# Patient Record
Sex: FEMALE | Race: WHITE
[De-identification: names, ages, dates, MRNs, and addresses within clinical notes are randomized per-mention and may not be internally consistent; named-entity substitution may affect disease eponyms.]

---

## 2018-04-10 ENCOUNTER — HOSPITAL ENCOUNTER (OUTPATIENT)
Dept: HOSPITAL 80 - FIMAGING | Age: 38
End: 2018-04-10
Attending: OBSTETRICS & GYNECOLOGY
Payer: COMMERCIAL

## 2018-04-10 DIAGNOSIS — O09.522: Primary | ICD-10-CM

## 2018-04-10 DIAGNOSIS — Z3A.13: ICD-10-CM

## 2018-05-22 ENCOUNTER — HOSPITAL ENCOUNTER (OUTPATIENT)
Dept: HOSPITAL 80 - FIMAGING | Age: 38
End: 2018-05-22
Attending: OBSTETRICS & GYNECOLOGY
Payer: COMMERCIAL

## 2018-05-22 DIAGNOSIS — Z3A.19: ICD-10-CM

## 2018-05-22 DIAGNOSIS — O09.522: Primary | ICD-10-CM

## 2018-10-07 ENCOUNTER — HOSPITAL ENCOUNTER (INPATIENT)
Dept: HOSPITAL 80 - FLD | Age: 38
LOS: 2 days | Discharge: HOME | End: 2018-10-09
Attending: ADVANCED PRACTICE MIDWIFE | Admitting: ADVANCED PRACTICE MIDWIFE
Payer: COMMERCIAL

## 2018-10-07 DIAGNOSIS — Z3A.38: ICD-10-CM

## 2018-10-07 PROCEDURE — 0KQM0ZZ REPAIR PERINEUM MUSCLE, OPEN APPROACH: ICD-10-PCS | Performed by: ADVANCED PRACTICE MIDWIFE

## 2018-10-07 NOTE — OBDEL
Birth Info


Birth Type: Vaginal


Presentation at Delivery: Vertex


L&D Analgesia/Anesthesia Type: None


GBS+: No


Intrapartum Medications: 





 














Discontinued Medications














Generic Name Dose Route Start Last Admin





  Trade Name Prabha  PRN Reason Stop Dose Admin


 


Ibuprofen  600 mg  10/07/18 16:44  10/07/18 19:13





  Motrin  PO   600 mg





  ONCE PRN   Administration





  post partum, pain   











Indications for Delivery: Spontaneous Labor, SROM





Vaginal Delivery





- Delivery Provider


Delivery Physician/CNM: Nikki Singh





- Labor and Delivery


Onset of Contractions Date: 10/07/18


Onset of Contractions Time: 12:00


Onset of Contractions Type: Spontaneous


Rupture of Membranes Date: 10/07/18


Rupture of Membranes Type: Spontaneous


Amniotic Fluid Color: Clear


Dilation Complete Date: 10/07/18


Placenta Delivery Date: 10/07/18


Placenta Delivery Time: 18:38


Total Hours of Labor: 6


Laceration: 2nd Degree


Repair: 3-0, Vicryl


Vaginal Sponge Count Correct: Yes


Vaginal Needle Count Correct: Yes


Vaginal Sweep Performed: Yes


EBL: 250


Delivery Events: Other (Specify) (body/shoulder cord)


Delivery Comment: 





mom delivered on hands/knees





 Birth Data


AMADOU: 10[birth year]


Gestational Age: 38 week(s) and 6 day(s)


  ** Zeng


Delivery Date: 10/07/18


Delivery Time: 18:21


Sex of Infant: Male


Apgar Score (1 Min): 8


Apgar Score (5 Min): 9





ICD10 Worksheet


Patient Problems: 


 Problems











Problem Status Onset


 


Labor and delivery, indication for care Acute  


 


 (spontaneous vaginal delivery) Acute  


 


Second degree perineal laceration during delivery Acute  














- ICD10 Problem Qualifiers


(1) Labor and delivery, indication for care








(2)  (spontaneous vaginal delivery)








(3) Second degree perineal laceration during delivery

## 2018-10-07 NOTE — PDGENHP
History and Physical


History and Physical: 


Prenatal Care: [facility]





HPI: 


Roxana Bynum is a 39yo  with IUP @ 38-6 weeks that presents to L&D 

with complaints of contractions since this afternoon. She denies any LOF, VB. 

She reports +FM. She is rating pain with contractions 6/10.  


EDC: 10/15/18 which is based on LMP: 18 which is known and consistent with 

Ultrasound at 7 weeks.


Her pregnancy is complicated by:  AMA





Review of Systems:


Constitutional: Denies any fever, chills, or fatigue


HEENT: denies any visual changes, difficulty swallowing, hearing loss


Cardiovascular: Denies any chest pain, palpitations, leg swelling


Respiratory: denies any cough, wheezing, or shortness of breathe


GI: Denies any nausea, vomiting, diarrhea, constipation


: denies any dysuria, urgency, frequency, vaginal bleeding


Musculoskeletal: denies any muscle or bone pain


Skin: denies any rashes


Neuro: denies any headache, seizures, lightheadedness, dizziness, or loss of 

consciousness


Psychiatric: denies any depression, anxiety, or SI/HI thoughts





HISTORY: 


Previous OB history: EAB


Past medical history: none


Past surgical history: oral surgery


Social: Denies any alcohol, tobacco, or drug use.


Family history: Not relevant


Medications: PNV, vitamin K, vitamin K, DHA, topical magnesium 


Allergies (list reaction): NKDA





PRENATAL LABS:


Rh: A+


ABS: Neg


Rubella: Immune


HbsAg: NR


HIV: NR


VDRL: NR


1hr:  73


GC: Neg


Chlamydia: Neg 


Pap: Normal


GBS:  negative 





PHYSICAL EXAM:


Constitutional: WN, A&Ox3


HEENT: normocephalic atraumatic, supple


Skin: Warm, dry, intact


Heart: RRR, no murmur


Chest: CTA-B


Abdomen: Soft, nontender, gravid


SVE: 5/90/-2


Extremities: edema, negative homans sign


Neuro: grossly normal


Psych: normal affect





Fetal assessment:  FHT baseline 135 +accels, no decels, moderate variability 


Contractions: toco q 2





Assessment: 


1) [id number] with IUP@ 38-6wks


2) active labor


3) GBS negative


4) Cat 1 FHR tracing





Plan: 


1) Admit to L&D


2) expectant management


3) anticipate 








Today's visit was approximately 30min, of which >50% of visit 20 min, was spent 

face to face with pt on direct counseling/coordination of care.

## 2018-10-08 RX ADMIN — DOCUSATE SODIUM PRN MG: 100 CAPSULE, LIQUID FILLED ORAL at 09:54

## 2018-10-08 RX ADMIN — IBUPROFEN SCH MG: 600 TABLET ORAL at 01:07

## 2018-10-08 RX ADMIN — ACETAMINOPHEN SCH MG: 325 TABLET ORAL at 13:17

## 2018-10-08 RX ADMIN — ACETAMINOPHEN SCH MG: 325 TABLET ORAL at 01:07

## 2018-10-08 RX ADMIN — DOCUSATE SODIUM PRN MG: 100 CAPSULE, LIQUID FILLED ORAL at 19:38

## 2018-10-08 RX ADMIN — IBUPROFEN SCH MG: 600 TABLET ORAL at 07:15

## 2018-10-08 RX ADMIN — ACETAMINOPHEN SCH MG: 325 TABLET ORAL at 07:15

## 2018-10-08 RX ADMIN — IBUPROFEN SCH MG: 600 TABLET ORAL at 13:17

## 2018-10-08 RX ADMIN — IBUPROFEN SCH MG: 600 TABLET ORAL at 19:38

## 2018-10-08 RX ADMIN — ACETAMINOPHEN SCH: 325 TABLET ORAL at 01:12

## 2018-10-08 RX ADMIN — ACETAMINOPHEN SCH MG: 325 TABLET ORAL at 19:39

## 2018-10-08 RX ADMIN — DOCUSATE SODIUM PRN MG: 100 CAPSULE, LIQUID FILLED ORAL at 01:05

## 2018-10-08 NOTE — OBPP
PostPartum Progress Note


Assessment/Plan: 


Assessment:


























Plan:





10/08/18 12:45


PPD 1


Establishing breastfeeding


Subjective/Postpartum Course: 





10/08/18 12:45


Tired but feeling good.  Very happy with birth experience.  Baby had nursed well

, now sleepy.  Lactation coming in soon to help.  Pain well controlled with 

ibuprofen and tylenol


Objective: 





 











Temp Pulse Resp BP Pulse Ox


 


 36.4 C   80   18   97/55 L  96 


 


 10/08/18 10:03  10/08/18 10:03  10/08/18 10:03  10/08/18 10:03  10/08/18 10:03








Nipples intact, breasts soft


Uterine Position/Fundal Height: Umbilicus -1


Uterine Tone: Firm

## 2018-10-09 VITALS — DIASTOLIC BLOOD PRESSURE: 63 MMHG | SYSTOLIC BLOOD PRESSURE: 103 MMHG

## 2018-10-09 RX ADMIN — ACETAMINOPHEN SCH MG: 325 TABLET ORAL at 09:11

## 2018-10-09 RX ADMIN — ACETAMINOPHEN SCH MG: 325 TABLET ORAL at 02:07

## 2018-10-09 RX ADMIN — IBUPROFEN SCH MG: 600 TABLET ORAL at 09:10

## 2018-10-09 RX ADMIN — IBUPROFEN SCH MG: 600 TABLET ORAL at 02:08

## 2018-10-09 NOTE — OBGCSDC
General Delivery Information





- General Info


: 2


Para: 1


Abortions: 1


Birth Type: Vaginal


L&D Analgesia/Anesthesia Type: Nitrous


Admission Date: 10/07/18





- Hospital Course


Postpartum: 





10/08/18 12:45


Tired but feeling good.  Very happy with birth experience.  Baby had nursed well

, now sleepy.  Lactation coming in soon to help.  Pain well controlled with 

ibuprofen and tylenol


10/09/18 10:40


S) Pt doing well, reports min pain and bleeding. she is ambulating and voiding 

without difficulty. She is breastfeeding. She desires discharge home today.


 O) VSS, afebrile


 constitutional: WNWF, A&Ox3


 HEENT: normocephalic, atraumatic, supple


 Heart: RRR, No murmur


 Chest: CTA-B


 Abdomen:  Soft, nontender


 Uterus:  Firm at U-2


 Lochia:  Minimal rubra


 Perineum:  Intact, healing well


 Extremities:  Trace edema, and negative Willie's sign


 Neuro:  Grossly normal


 A) 38 year-old 


 S/P 


 PPD#2


 Breastfeeding


 P)


 Discharge home today


 Continue breastfeeding


 Pelvic rest x6wks


 Discussed danger signs (infection, preeclampsia, depression, heavy bleeding, 

etc)


 RTO in 2/4/6 weeks


10/09/18 14:14








Vaginal Birth





- Delivery Provider


Delivery Physician/CNM: Nikki Singh





- Diagnosis


Labor: Spontaneous


Rupture of Membranes Type: Spontaneous


Amniotic Fluid Color: Clear


Laceration: 2nd Degree


Repair: 3-0, Vicryl


Delivery Events: Other (Specify) (body/shoulder cord)





 Birth





-  Delivery


EBL: 250





[place] Birth Data


AMADOU: 10[birth year]


Gestational Age: 39 week(s) and 1 day(s)


  ** Zeng


Delivery Date: 10/07/18


Delivery Time: 18:21


Sex of Infant: Male


Apgar Score (1 Min): 8


Apgar Score (5 Min): 9





Discharge Information





- Discharge Information


Prescriptions: 


Docusate Sodium [Colace 100 MG (*)] 100 mg PO BID PRN #30 cap


 PRN Reason: Constipation


Ibuprofen [Motrin (*)] 600 mg PO Q6H #30 tab


Condition: Good